# Patient Record
Sex: MALE | Race: OTHER
[De-identification: names, ages, dates, MRNs, and addresses within clinical notes are randomized per-mention and may not be internally consistent; named-entity substitution may affect disease eponyms.]

---

## 2018-08-09 ENCOUNTER — HOSPITAL ENCOUNTER (EMERGENCY)
Dept: HOSPITAL 72 - EMR | Age: 23
Discharge: HOME | End: 2018-08-09
Payer: COMMERCIAL

## 2018-08-09 VITALS — SYSTOLIC BLOOD PRESSURE: 113 MMHG | DIASTOLIC BLOOD PRESSURE: 73 MMHG

## 2018-08-09 VITALS — BODY MASS INDEX: 26.68 KG/M2 | WEIGHT: 170 LBS | HEIGHT: 67 IN
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
## 2018-08-09 DIAGNOSIS — J45.909: ICD-10-CM

## 2018-08-09 DIAGNOSIS — S70.12XA: Primary | ICD-10-CM
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
## 2018-08-09 PROCEDURE — 96372 THER/PROPH/DIAG INJ SC/IM: CPT

## 2018-08-09 PROCEDURE — 99283 EMERGENCY DEPT VISIT LOW MDM: CPT

## 2018-08-09 PROCEDURE — 73552 X-RAY EXAM OF FEMUR 2/>: CPT

## 2018-08-10 NOTE — DIAGNOSTIC IMAGING REPORT
Indications: Reason For Exam: PAIN

 

Technique: Two views of the left femur

 

Comparison: None

 

Findings: There is a bone island within the proximal femur. No acute fractures. No

dislocations. No radiopaque foreign body

 

Impression: Negative
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
